# Patient Record
Sex: MALE | Race: WHITE | Employment: OTHER | URBAN - METROPOLITAN AREA
[De-identification: names, ages, dates, MRNs, and addresses within clinical notes are randomized per-mention and may not be internally consistent; named-entity substitution may affect disease eponyms.]

---

## 2017-11-22 ENCOUNTER — APPOINTMENT (OUTPATIENT)
Dept: GENERAL RADIOLOGY | Age: 42
End: 2017-11-22
Attending: PHYSICIAN ASSISTANT
Payer: COMMERCIAL

## 2017-11-22 ENCOUNTER — HOSPITAL ENCOUNTER (OUTPATIENT)
Age: 42
Discharge: HOME | End: 2017-11-22
Payer: COMMERCIAL

## 2017-11-22 VITALS
TEMPERATURE: 99 F | OXYGEN SATURATION: 98 % | RESPIRATION RATE: 18 BRPM | SYSTOLIC BLOOD PRESSURE: 111 MMHG | HEIGHT: 70 IN | DIASTOLIC BLOOD PRESSURE: 75 MMHG | BODY MASS INDEX: 23.62 KG/M2 | HEART RATE: 89 BPM | WEIGHT: 165 LBS

## 2017-11-22 DIAGNOSIS — J40 BRONCHITIS: Primary | ICD-10-CM

## 2017-11-22 PROCEDURE — 71020 XR CHEST PA + LAT CHEST (CPT=71020): CPT | Performed by: PHYSICIAN ASSISTANT

## 2017-11-22 PROCEDURE — 99213 OFFICE O/P EST LOW 20 MIN: CPT

## 2017-11-22 PROCEDURE — 99214 OFFICE O/P EST MOD 30 MIN: CPT

## 2017-11-22 RX ORDER — METHYLPREDNISOLONE 4 MG/1
TABLET ORAL
Qty: 1 PACKAGE | Refills: 0 | Status: SHIPPED | OUTPATIENT
Start: 2017-11-22 | End: 2017-11-27

## 2017-11-22 RX ORDER — AZITHROMYCIN 250 MG/1
TABLET, FILM COATED ORAL
Qty: 1 PACKAGE | Refills: 0 | Status: SHIPPED | OUTPATIENT
Start: 2017-11-22 | End: 2017-11-27

## 2017-11-22 RX ORDER — BENZONATATE 100 MG/1
100 CAPSULE ORAL 3 TIMES DAILY PRN
Qty: 30 CAPSULE | Refills: 0 | Status: SHIPPED | OUTPATIENT
Start: 2017-11-22 | End: 2017-12-22

## 2017-11-22 NOTE — ED PROVIDER NOTES
[facility]    History   Patient presents with:  Cough/URI    Stated Complaint: CONGESTION, cough    HPI    Patient is a 42-year-old otherwise healthy male who presents for evaluation of productive cough × normal. Pupils are equal, round, and reactive to light. Neck: Normal range of motion. Cardiovascular: Normal rate, regular rhythm, normal heart sounds and intact distal pulses. Pulmonary/Chest: Effort normal. He has no decreased breath sounds.  He ha

## 2017-11-22 NOTE — ED INITIAL ASSESSMENT (HPI)
C/o persistent cough for 3 weeks. Low grade temp started last night. Productive cough. Denies chest pain. Feels like he's wheezing at times. No SOB.

## 2019-10-12 ENCOUNTER — APPOINTMENT (OUTPATIENT)
Dept: GENERAL RADIOLOGY | Age: 44
End: 2019-10-12
Attending: FAMILY MEDICINE
Payer: COMMERCIAL

## 2019-10-12 ENCOUNTER — HOSPITAL ENCOUNTER (OUTPATIENT)
Age: 44
Discharge: HOME | End: 2019-10-12
Attending: FAMILY MEDICINE
Payer: COMMERCIAL

## 2019-10-12 VITALS
DIASTOLIC BLOOD PRESSURE: 79 MMHG | SYSTOLIC BLOOD PRESSURE: 123 MMHG | WEIGHT: 165 LBS | OXYGEN SATURATION: 99 % | TEMPERATURE: 98 F | HEART RATE: 66 BPM | BODY MASS INDEX: 24 KG/M2 | RESPIRATION RATE: 16 BRPM

## 2019-10-12 DIAGNOSIS — M79.672 LEFT FOOT PAIN: Primary | ICD-10-CM

## 2019-10-12 PROCEDURE — 99214 OFFICE O/P EST MOD 30 MIN: CPT

## 2019-10-12 PROCEDURE — 73630 X-RAY EXAM OF FOOT: CPT | Performed by: FAMILY MEDICINE

## 2019-10-12 PROCEDURE — 99213 OFFICE O/P EST LOW 20 MIN: CPT

## 2019-10-12 RX ORDER — PREDNISONE 20 MG/1
40 TABLET ORAL DAILY
Qty: 10 TABLET | Refills: 0 | Status: SHIPPED | OUTPATIENT
Start: 2019-10-12 | End: 2019-10-17

## 2019-10-12 NOTE — ED PROVIDER NOTES
Patient Seen in: [facility]      History   Patient presents with:   Foot Pain    Stated Complaint: foot pain    HPI    L foot pain   Base of 1st metatarsophalangeal joint  X 3-4 days  Stubbed toe 2 days ago and pain worsen Right foot 1st digit pain and swelling post injury x1 day ago. TECHNIQUE:    3 views were obtained. FINDINGS:          BONES:             There is joint space narrowing with marginal sclerosis at the 1st metatarsophalangeal joint.   Small osteophy

## 2019-10-12 NOTE — ED INITIAL ASSESSMENT (HPI)
PATIENT ARRIVED AMBULATORY TO ROOM C/O RIGHT FOOT PAIN THAT STARTED A FEW WEEKS AGO. PATIENT STATES HE HIT HIS FOOT A COUPLE OF DAYS AGO AND ITS BEEN HURTING SINCE.

## 2020-09-09 ENCOUNTER — HOSPITAL ENCOUNTER (OUTPATIENT)
Age: 45
Discharge: HOME | End: 2020-09-09
Attending: EMERGENCY MEDICINE
Payer: COMMERCIAL

## 2020-09-09 VITALS
SYSTOLIC BLOOD PRESSURE: 124 MMHG | HEART RATE: 62 BPM | OXYGEN SATURATION: 100 % | RESPIRATION RATE: 18 BRPM | TEMPERATURE: 99 F | DIASTOLIC BLOOD PRESSURE: 68 MMHG

## 2020-09-09 DIAGNOSIS — J02.0 STREPTOCOCCAL SORE THROAT: Primary | ICD-10-CM

## 2020-09-09 LAB — S PYO AG THROAT QL: POSITIVE

## 2020-09-09 PROCEDURE — 87430 STREP A AG IA: CPT

## 2020-09-09 PROCEDURE — 99213 OFFICE O/P EST LOW 20 MIN: CPT

## 2020-09-09 PROCEDURE — 99214 OFFICE O/P EST MOD 30 MIN: CPT

## 2020-09-09 RX ORDER — AMOXICILLIN 875 MG/1
875 TABLET, COATED ORAL 2 TIMES DAILY
Qty: 20 TABLET | Refills: 0 | Status: SHIPPED | OUTPATIENT
Start: 2020-09-09 | End: 2020-09-19

## 2020-09-10 NOTE — ED INITIAL ASSESSMENT (HPI)
PATIENT ARRIVED AMBULATORY TO ROOM C/O A SORE THROAT THAT STARTED THIS MORNING. PATIENT'S WIFE AND SON WERE IN THE IC TODAY AND POSITIVE FOR STREP. NO FEVERS. NO N/V/D.

## 2020-09-10 NOTE — ED PROVIDER NOTES
Patient Seen in: [facility]      History   Patient presents with:  Sore Throat    Stated Complaint: sore throat    HPI    The patient is a 39-year-old male with no significant past medical history presents now with sore t and lower extremities bilaterally  Extremities: No focal swelling or tenderness  Skin: No pallor, no redness or warmth to the touch      ED Course     Labs Reviewed   EMH POCT RAPID STREP - Abnormal; Notable for the following components:       Result Value

## 2021-06-17 ENCOUNTER — OFFICE VISIT (OUTPATIENT)
Dept: FAMILY MEDICINE CLINIC | Facility: CLINIC | Age: 46
End: 2021-06-17
Payer: COMMERCIAL

## 2021-06-17 VITALS
SYSTOLIC BLOOD PRESSURE: 131 MMHG | DIASTOLIC BLOOD PRESSURE: 85 MMHG | WEIGHT: 163 LBS | HEART RATE: 64 BPM | BODY MASS INDEX: 24.14 KG/M2 | HEIGHT: 69 IN

## 2021-06-17 DIAGNOSIS — H90.72 MIXED CONDUCTIVE AND SENSORINEURAL HEARING LOSS OF LEFT EAR WITH UNRESTRICTED HEARING OF RIGHT EAR: ICD-10-CM

## 2021-06-17 DIAGNOSIS — Z00.00 ROUTINE GENERAL MEDICAL EXAMINATION AT A HEALTH CARE FACILITY: Primary | ICD-10-CM

## 2021-06-17 PROCEDURE — 3075F SYST BP GE 130 - 139MM HG: CPT | Performed by: FAMILY MEDICINE

## 2021-06-17 PROCEDURE — 99386 PREV VISIT NEW AGE 40-64: CPT | Performed by: FAMILY MEDICINE

## 2021-06-17 PROCEDURE — 3008F BODY MASS INDEX DOCD: CPT | Performed by: FAMILY MEDICINE

## 2021-06-17 PROCEDURE — 3079F DIAST BP 80-89 MM HG: CPT | Performed by: FAMILY MEDICINE

## 2021-06-17 NOTE — PROGRESS NOTES
HPI/Subjective:   Patient ID: Reyna Buckley is a 48year old male.     HPI  Patient presents with:  Routine Physical: ANNUAL physical, new pt     Past Medical History:   Diagnosis Date   • Seasonal allergies      History/Other:   Review of Systems   Con with unrestricted hearing of right ear    Labs ordered. Follow up audiology evaluation. I reviewed past ENT notes and audiology exam and he would maybe benefit from correction.    Orders Placed This Encounter      CBC With Differential With Platelet      Co

## 2021-11-22 ENCOUNTER — LAB ENCOUNTER (OUTPATIENT)
Dept: LAB | Age: 46
End: 2021-11-22
Attending: FAMILY MEDICINE
Payer: COMMERCIAL

## 2021-11-22 DIAGNOSIS — Z00.00 ROUTINE GENERAL MEDICAL EXAMINATION AT A HEALTH CARE FACILITY: ICD-10-CM

## 2021-11-22 PROCEDURE — 80061 LIPID PANEL: CPT

## 2021-11-22 PROCEDURE — 80053 COMPREHEN METABOLIC PANEL: CPT

## 2021-11-22 PROCEDURE — 85025 COMPLETE CBC W/AUTO DIFF WBC: CPT

## 2021-11-22 PROCEDURE — 36415 COLL VENOUS BLD VENIPUNCTURE: CPT

## 2023-07-18 ENCOUNTER — HOSPITAL ENCOUNTER (OUTPATIENT)
Dept: MRI IMAGING | Age: 48
Discharge: HOME | End: 2023-07-18
Attending: Other
Payer: COMMERCIAL

## 2023-07-18 ENCOUNTER — HOSPITAL ENCOUNTER (OUTPATIENT)
Dept: MRI IMAGING | Age: 48
End: 2023-07-18
Attending: Other
Payer: COMMERCIAL

## 2023-07-18 DIAGNOSIS — H90.42 SENSORINEURAL HEARING LOSS (SNHL) OF LEFT EAR WITH UNRESTRICTED HEARING OF RIGHT EAR: ICD-10-CM

## 2023-07-18 PROCEDURE — 70553 MRI BRAIN STEM W/O & W/DYE: CPT | Performed by: OTHER

## 2023-07-18 PROCEDURE — A9575 INJ GADOTERATE MEGLUMI 0.1ML: HCPCS

## 2023-07-18 RX ORDER — GADOTERATE MEGLUMINE 376.9 MG/ML
15 INJECTION INTRAVENOUS
Status: COMPLETED | OUTPATIENT
Start: 2023-07-18 | End: 2023-07-18

## 2023-07-18 RX ADMIN — GADOTERATE MEGLUMINE 15 ML: 376.9 INJECTION INTRAVENOUS at 13:26:00

## 2024-02-21 ENCOUNTER — HOSPITAL ENCOUNTER (OUTPATIENT)
Age: 49
Discharge: HOME | End: 2024-02-21
Attending: EMERGENCY MEDICINE
Payer: COMMERCIAL

## 2024-02-21 VITALS
OXYGEN SATURATION: 96 % | SYSTOLIC BLOOD PRESSURE: 112 MMHG | HEART RATE: 77 BPM | DIASTOLIC BLOOD PRESSURE: 61 MMHG | TEMPERATURE: 99 F | RESPIRATION RATE: 19 BRPM

## 2024-02-21 DIAGNOSIS — J10.1 INFLUENZA A: Primary | ICD-10-CM

## 2024-02-21 LAB
POCT INFLUENZA A: POSITIVE
POCT INFLUENZA B: NEGATIVE
S PYO AG THROAT QL IA.RAPID: NEGATIVE
SARS-COV-2 RNA RESP QL NAA+PROBE: NOT DETECTED

## 2024-02-21 PROCEDURE — 99212 OFFICE O/P EST SF 10 MIN: CPT

## 2024-02-21 PROCEDURE — 87651 STREP A DNA AMP PROBE: CPT | Performed by: EMERGENCY MEDICINE

## 2024-02-21 PROCEDURE — 87502 INFLUENZA DNA AMP PROBE: CPT | Performed by: EMERGENCY MEDICINE

## 2024-02-21 PROCEDURE — 99203 OFFICE O/P NEW LOW 30 MIN: CPT

## 2024-02-22 NOTE — ED PROVIDER NOTES
Patient Seen in: [facility]      History     Chief Complaint   Patient presents with    Fever    Sore Throat     Stated Complaint: sore throat fever    Subjective:   HPI    Patient is a 49-year-old male, unvaccinated for COVID, no flu vaccine who presents now with sore throat, cough, congestion, fever.  The patient states the symptoms began 2 days ago.  Patient states temperatures as high as 102-103.  Patient states temperature responds somewhat to Motrin/Tylenol.  The patient recently traveled to [place] and was in a crowded plane on both flights.    Objective:   No pertinent past medical history.            No pertinent past surgical history.              No pertinent social history.            Review of Systems    Positive for stated complaint: sore throat fever  Other systems are as noted in HPI.  Constitutional and vital signs reviewed.      All other systems reviewed and negative except as noted above.    Physical Exam     ED Triage Vitals [02/21/24 1814]   /61   Pulse 77   Resp 19   Temp 99.1 °F (37.3 °C)   Temp src Temporal   SpO2 96 %   O2 Device None (Room air)       Current:/61   Pulse 77   Temp 99.1 °F (37.3 °C) (Temporal)   Resp 19   SpO2 96%         Physical Exam    Constitutional: Well-developed well-nourished in no acute distress  Head: Normocephalic, no swelling or tenderness  Eyes: Nonicteric sclera, no conjunctival injection  ENT: TMs are clear and flat bilaterally.  There is minimal posterior pharyngeal erythema  Chest: Clear to auscultation, no tenderness  Cardiovascular: Regular rate and rhythm without murmur  Abdomen: Soft, nontender and nondistended  Neurologic: Patient is awake, alert and oriented ×3.  The patient's motor strength is 5 out of 5 and symmetric in the upper and lower extremities bilaterally  Extremities: No focal swelling or tenderness  Skin: No pallor, no redness or warmth to the touch      ED Course     Labs Reviewed   POCT FLU TEST - Abnormal;  Notable for the following components:       Result Value    POCT INFLUENZA A Positive (*)     All other components within normal limits    Narrative:     This assay is a rapid molecular in vitro test utilizing nucleic acid amplification of influenza A and B viral RNA.   RAPID STREP A - Normal   RAPID SARS-COV-2 BY PCR - Normal             Pulse ox is 96% on room air, normal.  Vital signs are stable     Patient's negative COVID, negative strep, positive influenza A were discussed with the patient and his family.  The availability of Tamiflu was discussed as the patient is within the first 48 hours of symptoms.  Patient and his family declined.    MDM      Viral syndrome versus COVID versus strep throat                                   Medical Decision Making      Disposition and Plan     Clinical Impression:  1. Influenza A         Disposition:  Discharge  2/21/2024  6:48 pm    Follow-up:  Oh Bañuelos, DO  [address] [place] [id number]  [phone]      As needed          Medications Prescribed:  There are no discharge medications for this patient.

## 2024-03-04 ENCOUNTER — APPOINTMENT (OUTPATIENT)
Dept: GENERAL RADIOLOGY | Age: 49
End: 2024-03-04
Attending: NURSE PRACTITIONER
Payer: COMMERCIAL

## 2024-03-04 ENCOUNTER — HOSPITAL ENCOUNTER (OUTPATIENT)
Age: 49
Discharge: HOME | End: 2024-03-04
Payer: COMMERCIAL

## 2024-03-04 VITALS
SYSTOLIC BLOOD PRESSURE: 108 MMHG | HEART RATE: 80 BPM | RESPIRATION RATE: 18 BRPM | DIASTOLIC BLOOD PRESSURE: 66 MMHG | OXYGEN SATURATION: 98 % | TEMPERATURE: 100 F

## 2024-03-04 DIAGNOSIS — J06.9 VIRAL URI WITH COUGH: Primary | ICD-10-CM

## 2024-03-04 PROCEDURE — 71046 X-RAY EXAM CHEST 2 VIEWS: CPT | Performed by: NURSE PRACTITIONER

## 2024-03-04 PROCEDURE — 99214 OFFICE O/P EST MOD 30 MIN: CPT

## 2024-03-04 PROCEDURE — 99213 OFFICE O/P EST LOW 20 MIN: CPT

## 2024-03-04 NOTE — ED INITIAL ASSESSMENT (HPI)
Patient arrives ambulatory with c/o ongoing coughing Reports he had the flu 2 weeks ago. Reports he was feeling better, but then on Sunday, started to feel fatigues, increased coughing, coughing up green/yellow mucous.

## 2024-03-04 NOTE — ED PROVIDER NOTES
Patient Seen in: [facility]      History     Chief Complaint   Patient presents with    Cough/URI     Stated Complaint: follow up on flu    Subjective:   HPI    49-year-old male presents to immediate care with complaints of a cough.  Patient states he had influenza 2 weeks ago.  He states he improved but then 2 days ago he developed a reoccurring cough.  Patient denies chest pain.  Patient denies feeling short of breath.  Patient states he did go out and ran 3 miles and felt great.    Objective:   Past Medical History:   Diagnosis Date    Seasonal allergies               History reviewed. No pertinent surgical history.             Social History     Socioeconomic History    Marital status:    Tobacco Use    Smoking status: Never    Smokeless tobacco: Never   Vaping Use    Vaping Use: Never used   Substance and Sexual Activity    Alcohol use: No    Drug use: No              Review of Systems    Positive for stated complaint: follow up on flu  Other systems are as noted in HPI.  Constitutional and vital signs reviewed.      All other systems reviewed and negative except as noted above.    Physical Exam     ED Triage Vitals [03/04/24 1335]   /66   Pulse 80   Resp 18   Temp 100.4 °F (38 °C)   Temp src Temporal   SpO2 98 %   O2 Device None (Room air)       Current:/66   Pulse 80   Temp 100.4 °F (38 °C) (Temporal)   Resp 18   SpO2 98%         Physical Exam  Vitals reviewed.   Constitutional:       General: He is not in acute distress.  HENT:      Nose: Nose normal.      Mouth/Throat:      Mouth: Mucous membranes are moist.   Cardiovascular:      Rate and Rhythm: Normal rate and regular rhythm.   Pulmonary:      Effort: Pulmonary effort is normal. No respiratory distress.      Breath sounds: Normal breath sounds. No wheezing or rhonchi.   Musculoskeletal:         General: Normal range of motion.      Cervical back: Normal range of motion and neck supple.   Skin:     General: Skin is  warm and dry.   Neurological:      General: No focal deficit present.      Mental Status: He is alert and oriented to person, place, and time.   Psychiatric:         Mood and Affect: Mood normal.         Behavior: Behavior normal.               ED Course   Labs Reviewed - No data to display       XR CHEST PA + LAT CHEST (CPT=71046)          PROCEDURE: XR CHEST PA + LAT CHEST (CPT=71046)     COMPARISON: [facility], XR CHEST PA + LAT CHEST (CPT=71020), 11/22/2017, 5:14 PM.     INDICATIONS: Continued cough post Flu 2 weeks.  Cough productive 1 day.  Fever today.     TECHNIQUE:   Two views.       FINDINGS:   CARDIAC/VASC: No cardiac silhouette abnormality or cardiomegaly.  Unremarkable pulmonary vasculature.    MEDIAST/CARL: No visible mass or adenopathy.   LUNGS/PLEURA: No significant pulmonary parenchymal abnormalities.  No effusion or pleural thickening.    BONES: Scattered mild degenerative endplate changes in the visualized thoracolumbar spine.  OTHER: Negative.                =====  CONCLUSION: No acute cardiopulmonary abnormality.           Dictated by (CST): Jitendra Sethi MD on 3/04/2024 at 2:00 PM       Finalized by (CST): Jitendra Sethi MD on 3/04/2024 at 2:00 PM                               [facility] [place]                                         Medical Decision Making  49-year-old male presents with cough.  Differential diagnosis includes viral upper respiratory infection, pneumonia, reactive airway disease.  Patient is most concerned he may have pneumonia post influenza.  Chest x-ray was done and shows normal chest.  Results were discussed with patient.  He was offered prescriptions for cough suppressant, albuterol inhaler but declined both.  Patient was given printed information and instructions for help with symptom relief.  He was instructed to follow-up with his primary care doctor.    Amount and/or Complexity of Data Reviewed  Radiology: ordered.     Details: CXR reviewed by IC  provider.  Shows normal chest    Risk  OTC drugs.        Disposition and Plan     Clinical Impression:  1. Viral URI with cough         Disposition:  Discharge  3/4/2024  2:13 pm    Follow-up:  Oh Bañuelos  [facility] [id number] [phone]      If symptoms worsen          Medications Prescribed:  There are no discharge medications for this patient.

## 2024-03-08 ENCOUNTER — OFFICE VISIT (OUTPATIENT)
Dept: FAMILY MEDICINE CLINIC | Facility: CLINIC | Age: 49
End: 2024-03-08

## 2024-03-08 VITALS
WEIGHT: 158 LBS | HEIGHT: 69 IN | SYSTOLIC BLOOD PRESSURE: 113 MMHG | BODY MASS INDEX: 23.4 KG/M2 | HEART RATE: 82 BPM | TEMPERATURE: 98 F | DIASTOLIC BLOOD PRESSURE: 76 MMHG | OXYGEN SATURATION: 98 %

## 2024-03-08 DIAGNOSIS — R05.2 SUBACUTE COUGH: Primary | ICD-10-CM

## 2024-03-08 PROBLEM — H90.5 SENSORINEURAL HEARING LOSS (SNHL) OF LEFT EAR: Status: ACTIVE | Noted: 2023-01-03

## 2024-03-08 PROCEDURE — 3078F DIAST BP <80 MM HG: CPT | Performed by: FAMILY MEDICINE

## 2024-03-08 PROCEDURE — 3074F SYST BP LT 130 MM HG: CPT | Performed by: FAMILY MEDICINE

## 2024-03-08 PROCEDURE — 3008F BODY MASS INDEX DOCD: CPT | Performed by: FAMILY MEDICINE

## 2024-03-08 PROCEDURE — 99214 OFFICE O/P EST MOD 30 MIN: CPT | Performed by: FAMILY MEDICINE

## 2024-03-08 RX ORDER — ALBUTEROL SULFATE 90 UG/1
2 AEROSOL, METERED RESPIRATORY (INHALATION) EVERY 4 HOURS PRN
Qty: 1 EACH | Refills: 0 | Status: SHIPPED | OUTPATIENT
Start: 2024-03-08

## 2024-03-08 NOTE — PROGRESS NOTES
Subjective:   Marco Soto is a 49 year old male who presents for Follow - Up (Pt c/o flu. Pt stts he still has a cough)   Was diagnosed with influenza B.  Has had lingering cough for two weeks. Occasional fever.     History/Other:    Chief Complaint Reviewed and Verified  Nursing Notes Reviewed and   Verified  Tobacco Reviewed  Allergies Reviewed  Medications Reviewed    Problem List Reviewed         Tobacco:  He has never smoked tobacco.    No current outpatient medications on file.         Review of Systems:  Review of Systems   Constitutional: Negative.    HENT: Negative.     Respiratory:  Positive for cough.    Cardiovascular: Negative.          Objective:   /76 (BP Location: Left arm, Patient Position: Sitting, Cuff Size: adult)   Pulse 82   Temp 98 °F (36.7 °C) (Temporal)   Ht 5' 9\" (1.753 m)   Wt 158 lb (71.7 kg)   SpO2 98%   BMI 23.33 kg/m²  Estimated body mass index is 23.33 kg/m² as calculated from the following:    Height as of this encounter: 5' 9\" (1.753 m).    Weight as of this encounter: 158 lb (71.7 kg).  Physical Exam  Vitals reviewed.   HENT:      Right Ear: Tympanic membrane and ear canal normal.      Left Ear: Tympanic membrane and ear canal normal.      Nose: Nose normal.   Cardiovascular:      Rate and Rhythm: Normal rate and regular rhythm.      Heart sounds: Normal heart sounds.   Pulmonary:      Effort: Pulmonary effort is normal.      Breath sounds: Normal breath sounds.           Assessment & Plan:   1. Subacute cough (Primary)    Recovering from influenza A.  Has had persisent cough and intermittent fever.  Inhaler use.  If fever again then call and will get CBC. Had normal CXR this week.     No follow-ups on file.    Oh Bañuelos DO, 3/8/2024, 3:36 PM

## 2024-06-28 ENCOUNTER — ORDER TRANSCRIPTION (OUTPATIENT)
Dept: ADMINISTRATIVE | Facility: HOSPITAL | Age: 49
End: 2024-06-28

## 2024-06-28 DIAGNOSIS — Z13.9 ENCOUNTER FOR SCREENING: Primary | ICD-10-CM

## 2024-07-15 ENCOUNTER — OFFICE VISIT (OUTPATIENT)
Dept: FAMILY MEDICINE CLINIC | Facility: CLINIC | Age: 49
End: 2024-07-15

## 2024-07-15 VITALS
SYSTOLIC BLOOD PRESSURE: 108 MMHG | HEART RATE: 63 BPM | HEIGHT: 69 IN | OXYGEN SATURATION: 97 % | DIASTOLIC BLOOD PRESSURE: 64 MMHG | WEIGHT: 157 LBS | BODY MASS INDEX: 23.25 KG/M2

## 2024-07-15 DIAGNOSIS — Z00.00 ROUTINE GENERAL MEDICAL EXAMINATION AT A HEALTH CARE FACILITY: Primary | ICD-10-CM

## 2024-07-15 NOTE — PROGRESS NOTES
Subjective:   Marco Soto is a 49 year old male who presents for Routine Physical       History/Other:    Chief Complaint Reviewed and Verified  No Further Nursing Notes to   Review  Tobacco Reviewed  Allergies Reviewed  Medications Reviewed    Problem List Reviewed  Medical History Reviewed  Surgical History   Reviewed  Family History Reviewed  Social History Reviewed         Tobacco:  He has never smoked tobacco.    No current outpatient medications on file.         Review of Systems:  Review of Systems   Constitutional: Negative.    HENT: Negative.     Eyes: Negative.    Respiratory: Negative.     Cardiovascular: Negative.    Gastrointestinal: Negative.    Endocrine: Negative for polydipsia, polyphagia and polyuria.   Genitourinary: Negative.    Musculoskeletal: Negative.    Skin: Negative.         No mole changes   Allergic/Immunologic: Negative for environmental allergies.   Neurological:  Negative for dizziness, weakness, numbness and headaches.   Hematological: Negative.    Psychiatric/Behavioral:  Negative for sleep disturbance.         No anxiety or depressed feelings         Objective:   /64   Pulse 63   Ht 5' 9\" (1.753 m)   Wt 157 lb (71.2 kg)   SpO2 97%   BMI 23.18 kg/m²  Estimated body mass index is 23.18 kg/m² as calculated from the following:    Height as of this encounter: 5' 9\" (1.753 m).    Weight as of this encounter: 157 lb (71.2 kg).  Physical Exam  Vitals reviewed.   Constitutional:       Appearance: Normal appearance. He is well-developed.   HENT:      Head: Normocephalic.      Right Ear: Tympanic membrane, ear canal and external ear normal.      Left Ear: Tympanic membrane, ear canal and external ear normal.      Nose: Nose normal.   Eyes:      General: Lids are normal.      Conjunctiva/sclera: Conjunctivae normal.      Pupils: Pupils are equal, round, and reactive to light.      Funduscopic exam:     Right eye: No hemorrhage or papilledema.         Left eye: No  hemorrhage or papilledema.   Neck:      Vascular: Normal carotid pulses. No JVD.      Trachea: Trachea normal.   Cardiovascular:      Rate and Rhythm: Regular rhythm.      Pulses:           Carotid pulses are 2+ on the right side and 2+ on the left side.       Radial pulses are 2+ on the right side and 2+ on the left side.      Heart sounds: Normal heart sounds.   Pulmonary:      Breath sounds: Normal breath sounds.   Abdominal:      Tenderness: There is no abdominal tenderness.   Musculoskeletal:      Cervical back: Normal, normal range of motion and neck supple.      Thoracic back: Normal.      Lumbar back: Normal.   Lymphadenopathy:      Cervical: No cervical adenopathy.   Skin:     Comments: No suspicious lesions waist up exam   Neurological:      General: No focal deficit present.      Mental Status: He is alert and oriented to person, place, and time.      Sensory: No sensory deficit.      Deep Tendon Reflexes: Reflexes are normal and symmetric.   Psychiatric:         Mood and Affect: Mood normal. Mood is not anxious or depressed.           Assessment & Plan:   1. Routine general medical examination at a health care facility (Primary)  -     CBC With Differential With Platelet; Future; Expected date: 07/15/2024  -     Comp Metabolic Panel (14); Future; Expected date: 07/15/2024  -     Lipid Panel; Future; Expected date: 07/15/2024  -     PSA Total, Screen; Future; Expected date: 07/15/2024  -     Urinalysis, Routine; Future; Expected date: 07/15/2024    No new issues laboratory testing ordered.  Patient did do a screening visit with a cardiologist who had ordered a CT calcium score.  Will await results      No follow-ups on file.    Oh Bañuelos DO, 7/15/2024, 4:30 PM

## 2024-07-17 ENCOUNTER — LAB ENCOUNTER (OUTPATIENT)
Dept: LAB | Age: 49
End: 2024-07-17
Attending: FAMILY MEDICINE
Payer: COMMERCIAL

## 2024-07-17 DIAGNOSIS — Z00.00 ROUTINE GENERAL MEDICAL EXAMINATION AT A HEALTH CARE FACILITY: ICD-10-CM

## 2024-07-17 LAB
ALBUMIN SERPL-MCNC: 4.6 G/DL (ref 3.2–4.8)
ALBUMIN/GLOB SERPL: 1.7 {RATIO} (ref 1–2)
ALP LIVER SERPL-CCNC: 40 U/L
ALT SERPL-CCNC: 14 U/L
ANION GAP SERPL CALC-SCNC: 5 MMOL/L (ref 0–18)
AST SERPL-CCNC: 22 U/L (ref ?–34)
BASOPHILS # BLD AUTO: 0.03 X10(3) UL (ref 0–0.2)
BASOPHILS NFR BLD AUTO: 0.6 %
BILIRUB SERPL-MCNC: 0.8 MG/DL (ref 0.3–1.2)
BILIRUB UR QL: NEGATIVE
BUN BLD-MCNC: 14 MG/DL (ref 9–23)
BUN/CREAT SERPL: 13.1 (ref 10–20)
CALCIUM BLD-MCNC: 9.4 MG/DL (ref 8.7–10.4)
CHLORIDE SERPL-SCNC: 110 MMOL/L (ref 98–112)
CHOLEST SERPL-MCNC: 181 MG/DL (ref ?–200)
CLARITY UR: CLEAR
CO2 SERPL-SCNC: 28 MMOL/L (ref 21–32)
COMPLEXED PSA SERPL-MCNC: 0.93 NG/ML (ref ?–4)
CREAT BLD-MCNC: 1.07 MG/DL
DEPRECATED RDW RBC AUTO: 36 FL (ref 35.1–46.3)
EGFRCR SERPLBLD CKD-EPI 2021: 84 ML/MIN/1.73M2 (ref 60–?)
EOSINOPHIL # BLD AUTO: 0.07 X10(3) UL (ref 0–0.7)
EOSINOPHIL NFR BLD AUTO: 1.3 %
ERYTHROCYTE [DISTWIDTH] IN BLOOD BY AUTOMATED COUNT: 11.3 % (ref 11–15)
FASTING PATIENT LIPID ANSWER: YES
FASTING STATUS PATIENT QL REPORTED: YES
GLOBULIN PLAS-MCNC: 2.7 G/DL (ref 2–3.5)
GLUCOSE BLD-MCNC: 101 MG/DL (ref 70–99)
GLUCOSE UR-MCNC: NORMAL MG/DL
HCT VFR BLD AUTO: 43.7 %
HDLC SERPL-MCNC: 55 MG/DL (ref 40–59)
HGB BLD-MCNC: 15 G/DL
IMM GRANULOCYTES # BLD AUTO: 0.01 X10(3) UL (ref 0–1)
IMM GRANULOCYTES NFR BLD: 0.2 %
KETONES UR-MCNC: NEGATIVE MG/DL
LDLC SERPL CALC-MCNC: 115 MG/DL (ref ?–100)
LEUKOCYTE ESTERASE UR QL STRIP.AUTO: NEGATIVE
LYMPHOCYTES # BLD AUTO: 1.28 X10(3) UL (ref 1–4)
LYMPHOCYTES NFR BLD AUTO: 24.2 %
MCH RBC QN AUTO: 30.1 PG (ref 26–34)
MCHC RBC AUTO-ENTMCNC: 34.3 G/DL (ref 31–37)
MCV RBC AUTO: 87.8 FL
MONOCYTES # BLD AUTO: 0.57 X10(3) UL (ref 0.1–1)
MONOCYTES NFR BLD AUTO: 10.8 %
NEUTROPHILS # BLD AUTO: 3.33 X10 (3) UL (ref 1.5–7.7)
NEUTROPHILS # BLD AUTO: 3.33 X10(3) UL (ref 1.5–7.7)
NEUTROPHILS NFR BLD AUTO: 62.9 %
NITRITE UR QL STRIP.AUTO: NEGATIVE
NONHDLC SERPL-MCNC: 126 MG/DL (ref ?–130)
OSMOLALITY SERPL CALC.SUM OF ELEC: 297 MOSM/KG (ref 275–295)
PH UR: 5 [PH] (ref 5–8)
PLATELET # BLD AUTO: 193 10(3)UL (ref 150–450)
POTASSIUM SERPL-SCNC: 4.4 MMOL/L (ref 3.5–5.1)
PROT SERPL-MCNC: 7.3 G/DL (ref 5.7–8.2)
PROT UR-MCNC: NEGATIVE MG/DL
RBC # BLD AUTO: 4.98 X10(6)UL
SODIUM SERPL-SCNC: 143 MMOL/L (ref 136–145)
SP GR UR STRIP: 1.02 (ref 1–1.03)
TRIGL SERPL-MCNC: 57 MG/DL (ref 30–149)
UROBILINOGEN UR STRIP-ACNC: NORMAL
VLDLC SERPL CALC-MCNC: 10 MG/DL (ref 0–30)
WBC # BLD AUTO: 5.3 X10(3) UL (ref 4–11)

## 2024-07-17 PROCEDURE — 36415 COLL VENOUS BLD VENIPUNCTURE: CPT

## 2024-07-17 PROCEDURE — 85025 COMPLETE CBC W/AUTO DIFF WBC: CPT

## 2024-07-17 PROCEDURE — 80061 LIPID PANEL: CPT

## 2024-07-17 PROCEDURE — 80053 COMPREHEN METABOLIC PANEL: CPT

## 2024-07-17 PROCEDURE — 81001 URINALYSIS AUTO W/SCOPE: CPT

## 2024-07-30 ENCOUNTER — HOSPITAL ENCOUNTER (OUTPATIENT)
Dept: CT IMAGING | Age: 49
Discharge: HOME | End: 2024-07-30
Attending: INTERNAL MEDICINE

## 2024-07-30 DIAGNOSIS — Z13.9 ENCOUNTER FOR SCREENING: ICD-10-CM

## 2024-07-30 DIAGNOSIS — Z13.6 SCREENING FOR HEART DISEASE: ICD-10-CM

## 2025-01-07 ENCOUNTER — OFFICE VISIT (OUTPATIENT)
Dept: PHYSICAL MEDICINE AND REHAB | Facility: CLINIC | Age: 50
End: 2025-01-07
Payer: COMMERCIAL

## 2025-01-07 VITALS — HEIGHT: 69 IN | WEIGHT: 160 LBS | BODY MASS INDEX: 23.7 KG/M2

## 2025-01-07 DIAGNOSIS — S86.811A STRAIN OF CALF MUSCLE, RIGHT, INITIAL ENCOUNTER: ICD-10-CM

## 2025-01-07 DIAGNOSIS — M77.12 LATERAL EPICONDYLITIS OF LEFT ELBOW: Primary | ICD-10-CM

## 2025-01-07 PROCEDURE — 3008F BODY MASS INDEX DOCD: CPT | Performed by: PHYSICAL MEDICINE & REHABILITATION

## 2025-01-07 PROCEDURE — 99204 OFFICE O/P NEW MOD 45 MIN: CPT | Performed by: PHYSICAL MEDICINE & REHABILITATION

## 2025-01-07 NOTE — PATIENT INSTRUCTIONS
-Start OT and home exercises  -Ice and Voltaren gel 3-4 x daily   -Counterforce elbow strap during the day  -Follow-up in 4 weeks

## 2025-01-07 NOTE — PROGRESS NOTES
[facility]  NEW PATIENT EVALUATION      HISTORY OF PRESENT ILLNESS:     Chief Complaint   Patient presents with    New Patient     New patient is here with complaints of left outer elbow pain and right upper calf . Pain started initially 3 summers ago due to golfing and says it started again in October and hasn't gone away. Calf pain started December 26th due to pickle ball. No N/T. Not taking any pain meds or muscle relaxer's. No imaging has been completed . Pain depends on activity and position.        The patient is a 49 year old male with no significant past medical history who presents with left lateral elbow pain.  Patient states the symptoms started 4 summers ago while playing golf.  It was severe enough that he could not finish his round.  Since then pain has been intermittent in nature with occasional flareups and usually happens with more activity such as with golfing.  He will take a few weeks off and the pain sort of subsides however never quite is completely resolved.  In October his symptoms flared up and since then the pain has been constant.  There are better days and worse days but when the pain is severe it can be very debilitating.  He denies any swelling in the elbow no numbness tingling.  He does notice some weakness when the pain happens. He has tried some exercises at home that he found online.  He also has another issue in the right calf where he was playing pickle ball about 2 weeks ago when he felt a sharp pain.  He denied any snap or pop.  He felt a sudden positional change created the pain.  No swelling or bruising no radiating pain no numbness tingling.     PHYSICAL EXAM:   Ht 69\"   Wt 160 lb (72.6 kg)   BMI 23.63 kg/m²       WRIST/HAND:  Inspection: no erythema, swelling, or obvious deformity  Palpation: no ttp in carpal bones or areas of pain  ROM: intact to all planes of motion  Strength: 5/5 mild decreased  strength  Sensation:  Intact to light touch in all dermatomes of the lower extremities  Reflexes: 2/4 at C5, C6, C7  Phalens Test: negative for reproducible symptoms  Reverse Phalens: negative for reproducible symptoms  Tinel's test: negative for reproducible symptoms    IMAGING:     None    All imaging results were reviewed and discussed with patient.      ASSESSMENT/PLAN:     1. Lateral epicondylitis of left elbow    2. Strain of calf muscle, right, initial encounter        Marco Soto is a pleasant 49-year-old male presenting today for evaluation of left lateral elbow pain with tennis elbow and lateral epicondylitis as well as right calf strain injury more recently 2 weeks ago.  I recommended starting a PT program with home exercises topical treatment with ice and Voltaren gel as well as obtaining a counterforce elbow strap during the day.  Recommend he follow-up with me in 4 weeks.  If his pain is no better we will consider further treatment options.      The patient verbalized understanding with the plan and was in agreement. All questions/concerns were addressed and there were no barriers to learning.  Please note Dragon dictation software was used to dictate this note and may result in inadvertent typos.    Mandie Rondon DO, FAAPMR & CAQSM  Physical Medicine and Rehabilitation  Sports and Spine Medicine    PAST MEDICAL HISTORY:     Past Medical History:    Seasonal allergies         PAST SURGICAL HISTORY:   History reviewed. No pertinent surgical history.      CURRENT MEDICATIONS:     No current outpatient medications on file.         ALLERGIES:   Allergies[1]      FAMILY HISTORY:     Family History   Problem Relation Age of Onset    Cancer Brother     Heart Disease Brother     Depression Father     Breast Cancer Mother     Diabetes Mother           SOCIAL HISTORY:     Social History     Socioeconomic History    Marital status:    Tobacco Use    Smoking status: Never    Smokeless tobacco: Never   Vaping Use    Vaping  status: Never Used   Substance and Sexual Activity    Alcohol use: No    Drug use: No   Other Topics Concern    Caffeine Concern Yes    Exercise Yes     Social Drivers of Health     Housing Stability: Low Risk  (9/25/2023)    [facility] from [facility], [facility]    Housing Stability   Recent Concern: Housing Stability - At Risk (8/24/2023)    [facility]          REVIEW OF SYSTEMS:   Patient-reported ROS  Constitutional  Sleep Disturbance: denies  Chills: denies  Fever: denies  Weight Gain: denies  Weight Loss: denies   Cardiovascular  Chest Pain: denies  Irregular Heartbeat: denies   Respiratory  Painful Breathing: denies  Wheezing: denies   Gastrointestinal  Bowel Incontinence: denies  Heartburn: denies  Abdominal Pain: denies  Blood in Stool : denies  Rectal Pain: denies   Hematology  Easy Bruising: denies  Easy Bleeding: denies   Genitourinary  Difficulty Urinating: denies  Bladder Incontinence: denies  Pelvic Pain: denies   Musculoskeletal  Joint Stiffness: denies  Painful Joints: denies  Tailbone Pain: denies  Swollen Joints: denies   Peripheral Vascular  Swelling of Legs/Feet: denies  Cold Extremities: denies   Skin  Open Sores: denies  Nodules or Lumps: denies  Rash: denies   Neurological  Loss of Strength Since last Visit: denies  Tingling/Numbness: denies  Balance: denies   Psychiatric  Anxiety: denies  Depressed Mood: denies       PHYSICAL EXAM:   General: No immediate distress  Head: Normocephalic/ Atraumatic  Eyes: Extra-occular movements intact.   Ears: No auricular hematoma or deformities  Mouth: No lesions or ulcerations  Heart: peripheral pulses intact. Normal capillary refill.   Lungs: Non-labored respirations  Abdomen: No abdominal guarding  Extremities: No lower extremity edema bilaterally   Skin: No lesions noted   Cognition: alert & oriented x 3, attentive, able to follow 2 step commands, comprehention intact,  spontaneous speech intact  Psychiatric: Mood and affect appropriate      LABS:   No results found for: \"EAG\", \"A1C\"  Lab Results   Component Value Date    WBC 5.3 07/17/2024    RBC 4.98 07/17/2024    HGB 15.0 07/17/2024    HCT 43.7 07/17/2024    MCV 87.8 07/17/2024    MCH 30.1 07/17/2024    MCHC 34.3 07/17/2024    RDW 11.3 07/17/2024    .0 07/17/2024    MPV 7.3 (L) 10/08/2015     Lab Results   Component Value Date     (H) 07/17/2024    BUN 14 07/17/2024    BUNCREA 13.1 07/17/2024    CREATSERUM 1.07 07/17/2024    ANIONGAP 5 07/17/2024    GFRNAA 79 11/22/2021    GFRAA 91 11/22/2021    CA 9.4 07/17/2024    OSMOCALC 297 (H) 07/17/2024    ALKPHO 40 (L) 07/17/2024    AST 22 07/17/2024    ALT 14 07/17/2024    ALKPHOS 35 10/08/2015    BILT 0.8 07/17/2024    TP 7.3 07/17/2024    ALB 4.6 07/17/2024    GLOBULIN 2.7 07/17/2024    AGRATIO 1.6 10/08/2015     07/17/2024    K 4.4 07/17/2024     07/17/2024    CO2 28.0 07/17/2024     No results found for: \"PTP\", \"PT\", \"INR\"  No results found for: \"VITD\", \"QVITD\", \"EFTP76AP\"         [1] No Known Allergies

## 2025-01-15 ENCOUNTER — TELEPHONE (OUTPATIENT)
Dept: PHYSICAL THERAPY | Facility: HOSPITAL | Age: 50
End: 2025-01-15

## 2025-01-21 NOTE — PROGRESS NOTES
OCCUPATIONAL THERAPY UPPER EXTREMITY EVALUATION     Diagnosis:   Lateral epicondylitis of left elbow (M77.12)      Referring Provider:  ODETTE DALY Date of Evaluation:    1/22/2025    Precautions:  None Next MD visit:   none scheduled  Date of Surgery: n/a   Order Date:  1/7/2025  Authorizing Provider:   ODETTE DALY     Procedure:  OCCUPATIONAL THERAPY - INTERNAL [[id number]]         Order #:   [id number]  Qty:  1     Priority:  Routine                   Class:   EM - RFL     Standing Interval:          Standing Occurrences:          Expires on:            Expected by:    Associated DX:  Lateral epicondylitis of left elbow (M77.12)  Strain of calf muscle, right, initial encounter (S86.338F)     Order summary:  EM - RFL, Routine, 18 visits  Occupational  Therapy Area of Concentration: Orthopedic  Occupational Therapy Ortho: UE  If the patient can be seen sooner with a PT vs an OT, can we cancel this order and replace with a PT order? No         Comments:  Eval and Treat 2-3 x week for 4-6 weeks     Assess for bracing  Improve ROM of the elbow and wrist  Stretching and strengthening of the forearm flexors/extensors  Myofascial release and soft tissue work as indicated  Modalities as needed including ice/heat, grastin, ultrasound  Dry needling if indicated  Please provide a HEP           PATIENT SUMMARY   Pt is a 49 year old male who presents to therapy today with complaints of decreased function.  Patient states the symptoms started 4 summers ago while playing golf.  It was severe enough that he could not finish his round.  Since then pain has been intermittent in nature with occasional flareups and usually happens with more activity such as with golfing.  He will take a few weeks off and the pain sort of subsides however never quite is completely resolved.  In October his symptoms flared up and since then the pain has been constant.  There are better days and worse days but when the pain is severe it can be  very debilitating.  He denies any swelling in the elbow no numbness tingling.  He does notice some weakness when the pain happens. He has tried some exercises at home that he found online.   Pt describes pain level current 3/10, at best 2/10, at worst 5/10.   Current functional limitations include working out, playing golf, lifting/carrying, construction.  Pt describes prior level of function independent.   Employment: Working full duty, pt is in construction  Hand Dominance: right  Living Situation: Family  Imaging/Tests: none  Pt goals include return to golf.  Past medical history was reviewed with pt. Significant findings include none    ASSESSMENT  Pt presents to occupational therapy evaluation with primary c/o decreased function. The results of the objective tests and measures show decreased strength, increased edema and increased subjective c/o pain.  Functional deficits include but are not limited to working out, playing golf, lifting/carrying, construction.  Signs and symptoms are consistent with diagnosis of lateral epicondylitis of left elbow. Pt and OT discussed evaluation findings, pathology, POC and HEP.  Pt voiced understanding and performs HEP correctly without reported pain. Skilled Occupational Therapy is medically necessary to address the above impairments and reach functional goals.     OBJECTIVE    OBSERVATION: Unremarkable     ORTHOTICS: tennis elbow band      SENSORY: WNL    CIRCUMFERENTIAL EDEMA (cm):  Right Elbow crease: 25.5  Left Elbow crease: 26.6    ROM: (* denotes performed with pain) WNL        Strength (lbs) Right Average Left Average   : 95; with elbow extended: 97 50; with elbow extended: 35   2 pt Pinch: 16 10   3 pt Pinch: 20 18   Lateral Pinch: 23 21       Today’s Treatment and Response:   Treatment provided today in addition to the initial evaluation:   Date 1/22/25         Visit # 1/8         Evaluation Initial  X         Manual                                              Ther ex                                                                                                         HEP issued See table below         Therapeutic Activity                                                          Neuromuscular Re-education                           Modalities       MHP x 5\" x                    X= performed this date as previously outlined    Pt education was provided on exam findings, treatment diagnosis, treatment plan, expectations, and prognosis. Pt was also provided recommendations for splint, proper body mechanics and return to golf, tennis. Patient was instructed in and issued a HEP.    HEP Date issued  Date amended Date discharged  Comments (HEP2Go code if used)   [place] protocol Phase I and II  Active stretches 3-4, passive stretches 1-2 1/22/25      Guidelines to return to golf, return to tennis 1/22/25                                             Charges: OT Eval: Low Complexity, 1TE      Total Timed Treatment: 45 min     Total Treatment Time: 45 min     Based on clinical rationale and outcome measures, this evaluation involved Low Complexity decision making due to 1-2 personal factors/comorbidities, body structures involved/activity limitations, and unstable symptoms including changing pain levels.  PLAN OF CARE   Goals: (to be met in 8 visits)  Increase (L)  strength by 4-5 lbs to allow pt to return to playing golf.   Increase (L) 2 pt pinch by 2-3 lbs to allow pt to use tools.   Pt to verbalize 2/3 proper body mechanics with 100% accuracy.   Pt will be independent and compliant with comprehensive HEP to maintain progress achieved in OT    Frequency / Duration: Patient will be seen for 2 x/week or a total of 8 visits over a 90 day period.  Treatment will include: Manual Therapy, Neuromuscular Re-education, Self-Care Home Management, Therapeutic Activities, Therapeutic Exercise, Home Exercise Program instruction, and Modalities to include: Electrical stimulation  (unattended), Ultrasound, and Phonophoresis (w/10% HYDROCORTISONE)    Education or treatment limitation: None  Rehab Potential:good    Patient/Family/Caregiver was advised of these findings, precautions, and treatment options and has agreed to actively participate in planning and for this course of care.    Thank you for your referral. Please co-sign or sign and return this letter via fax as soon as possible to [phone]. If you have any questions, please contact me at Dept: [phone]    Sincerely,  Electronically signed by therapist: BALTAZAR Hoffmann/L  Physician's certification required: Yes  I certify the need for these services furnished under this plan of treatment and while under my care.    X___________________________________________________ Date____________________    Certification From: 1/22/2025  To:4/26/2025

## 2025-01-22 ENCOUNTER — OFFICE VISIT (OUTPATIENT)
Dept: PHYSICAL THERAPY | Age: 50
End: 2025-01-22
Attending: PHYSICAL MEDICINE & REHABILITATION
Payer: COMMERCIAL

## 2025-01-22 DIAGNOSIS — S86.811A STRAIN OF CALF MUSCLE, RIGHT, INITIAL ENCOUNTER: ICD-10-CM

## 2025-01-22 DIAGNOSIS — M77.12 LATERAL EPICONDYLITIS OF LEFT ELBOW: Primary | ICD-10-CM

## 2025-01-22 PROCEDURE — 97165 OT EVAL LOW COMPLEX 30 MIN: CPT

## 2025-01-22 PROCEDURE — 97110 THERAPEUTIC EXERCISES: CPT

## 2025-01-30 ENCOUNTER — OFFICE VISIT (OUTPATIENT)
Dept: PHYSICAL THERAPY | Age: 50
End: 2025-01-30
Attending: PHYSICAL MEDICINE & REHABILITATION
Payer: COMMERCIAL

## 2025-01-30 PROCEDURE — 97140 MANUAL THERAPY 1/> REGIONS: CPT

## 2025-01-30 PROCEDURE — 97035 APP MDLTY 1+ULTRASOUND EA 15: CPT

## 2025-01-30 NOTE — PROGRESS NOTES
Diagnosis:   Lateral epicondylitis of left elbow (M77.12)      Referring Provider:  ODETTE DALY Date of Evaluation:    1/22/2025    Precautions:  None Next MD visit:   none scheduled  Date of Surgery: n/a   Order Date:  1/7/2025  Authorizing Provider:   ODETTE DALY     Procedure:  OCCUPATIONAL THERAPY - INTERNAL [[id number]]         Order #:   [id number]  Qty:  1     Priority:  Routine                   Class:   EM - RFL     Standing Interval:          Standing Occurrences:          Expires on:            Expected by:    Associated DX:  Lateral epicondylitis of left elbow (M77.12)  Strain of calf muscle, right, initial encounter (S86.814R)     Order summary:  EM - RFL, Routine, 18 visits  Occupational  Therapy Area of Concentration: Orthopedic  Occupational Therapy Ortho: UE  If the patient can be seen sooner with a PT vs an OT, can we cancel this order and replace with a PT order? No         Comments:  Eval and Treat 2-3 x week for 4-6 weeks     Assess for bracing  Improve ROM of the elbow and wrist  Stretching and strengthening of the forearm flexors/extensors  Myofascial release and soft tissue work as indicated  Modalities as needed including ice/heat, grastin, ultrasound  Dry needling if indicated  Please provide a HEP     SUBJECTIVE: Pt states that he has been doing the exercises.      PAIN LEVELS: 2-3/10        OBJECTIVE: See tx log for details.       ORTHOTICS: tennis elbow band      SENSORY: WNL    CIRCUMFERENTIAL EDEMA (cm):  Right Elbow crease: 25.5  Left Elbow crease: 26.6    ROM: (* denotes performed with pain) WNL        Strength (lbs) Right Average Left Average   : 95; with elbow extended: 97 50; with elbow extended: 35   2 pt Pinch: 16 10   3 pt Pinch: 20 18   Lateral Pinch: 23 21       ASSESSMENT  Reviewed HEP with pt. Pt performing as instructed.  Tightness noted at ECRB with palpation.  Pt wearing wrist immobilization splint but not correct length. Reccommended pt to wear longer splint.   Pt agreed.  Weakness also noted in musculature which may also be contributing to inflammation in ECRB.  Pt  will have to perform strengthening once pain levels subside to minimize reoccurrence.  Pt verbalized good understanding.      Today’s Treatment and Response:   Treatment provided today in addition to the initial evaluation:   Date 1/22/25 1/30/25       Visit # 1/8  2/8       Evaluation Initial  X         Manual           STM   x        IASTM    x        cross friction massage    x       Ther ex                                                                                                         HEP issued See table below         Therapeutic Activity                                                          Neuromuscular Re-education                           Modalities       MHP x 5\" x x     U.S. 3.0 Mhz, 1.0 w/cm^2 x 8\" pulsed to ECRB  #1            X= performed this date as previously outlined    Pt education was provided on exam findings, treatment diagnosis, treatment plan, expectations, and prognosis. Pt was also provided recommendations for splint, proper body mechanics and return to golf, tennis. Patient was instructed in and issued a HEP.    HEP Date issued  Date amended Date discharged  Comments (HEP2Go code if used)   [place] protocol Phase I and II  Active stretches 3-4, passive stretches 1-2 1/22/25      Guidelines to return to golf, return to tennis 1/22/25                                               Goals: (to be met in 8 visits)  Increase (L)  strength by 4-5 lbs to allow pt to return to playing golf.   Increase (L) 2 pt pinch by 2-3 lbs to allow pt to use tools.   Pt to verbalize 2/3 proper body mechanics with 100% accuracy.   Pt will be independent and compliant with comprehensive HEP to maintain progress achieved in OT    PLAN: Patient will be seen for 2 x/week or a total of 8 visits over a 90 day period.  Treatment will include: Manual Therapy, Neuromuscular Re-education, Self-Care  Home Management, Therapeutic Activities, Therapeutic Exercise, Home Exercise Program instruction, and Modalities to include: Electrical stimulation (unattended), Ultrasound, and Phonophoresis (w/10% HYDROCORTISONE)      Charges: 1MT, 1US       Total Timed Treatment: 30 min     Total Treatment Time: 30 min       Rukhsana Garcia,OTR/L

## 2025-02-05 ENCOUNTER — OFFICE VISIT (OUTPATIENT)
Dept: PHYSICAL THERAPY | Age: 50
End: 2025-02-05
Attending: PHYSICAL MEDICINE & REHABILITATION
Payer: COMMERCIAL

## 2025-02-05 PROCEDURE — 97140 MANUAL THERAPY 1/> REGIONS: CPT

## 2025-02-05 PROCEDURE — 97035 APP MDLTY 1+ULTRASOUND EA 15: CPT

## 2025-02-05 NOTE — PROGRESS NOTES
Diagnosis:   Lateral epicondylitis of left elbow (M77.12)      Referring Provider:  ODETTE DALY Date of Evaluation:    1/22/2025    Precautions:  None Next MD visit:   none scheduled  Date of Surgery: n/a   Order Date:  1/7/2025  Authorizing Provider:   ODETTE DALY     Procedure:  OCCUPATIONAL THERAPY - INTERNAL [[id number]]         Order #:   [id number]  Qty:  1     Priority:  Routine                   Class:   EM - RFL     Standing Interval:          Standing Occurrences:          Expires on:            Expected by:    Associated DX:  Lateral epicondylitis of left elbow (M77.12)  Strain of calf muscle, right, initial encounter (S86.753C)     Order summary:  EM - RFL, Routine, 18 visits  Occupational  Therapy Area of Concentration: Orthopedic  Occupational Therapy Ortho: UE  If the patient can be seen sooner with a PT vs an OT, can we cancel this order and replace with a PT order? No         Comments:  Eval and Treat 2-3 x week for 4-6 weeks     Assess for bracing  Improve ROM of the elbow and wrist  Stretching and strengthening of the forearm flexors/extensors  Myofascial release and soft tissue work as indicated  Modalities as needed including ice/heat, grastin, ultrasound  Dry needling if indicated  Please provide a HEP     SUBJECTIVE: Pt states that he's wearing the splint and it's been helping.       PAIN LEVELS: 2-3/10        OBJECTIVE: See tx log for details.       ORTHOTICS: tennis elbow band      SENSORY: WNL    CIRCUMFERENTIAL EDEMA (cm):  Right Elbow crease: 25.5  Left Elbow crease: 26.6    ROM: (* denotes performed with pain) WNL        Strength (lbs) Right Average Left Average   : 95; with elbow extended: 97 50; with elbow extended: 35   2 pt Pinch: 16 10   3 pt Pinch: 20 18   Lateral Pinch: 23 21       ASSESSMENT  Decreased tightness noted at ECRB. Pt able to perform passive stretches without pain. Pt unable to work out at gym due to pain levels. Issued eccentric exercise.  Pt demonstrated  good understanding of HEP.  Pt continues to wear writ immobilization splint at all times. Will reassess status at next visit to determine if strengthening appropriate at that time. Pt in agreement with plan.      Today’s Treatment and Response:   Treatment provided today in addition to the initial evaluation:   Date 1/22/25 1/30/25 2/5/25     Visit # 1/8  2/8  3/8     Evaluation Initial  X         Manual           STM   x  x      IASTM    x  x      cross friction massage    x  x     Ther ex                                                                                                         HEP issued See table below    x     Therapeutic Activity                                                          Neuromuscular Re-education                           Modalities       MHP x 5\" x x x    U.S. 3.0 Mhz, 1.0 w/cm^2 x 8\" pulsed to ECRB  #1 #2           X= performed this date as previously outlined    Pt education was provided on exam findings, treatment diagnosis, treatment plan, expectations, and prognosis. Pt was also provided recommendations for splint, proper body mechanics and return to golf, tennis. Patient was instructed in and issued a HEP.    HEP Date issued  Date amended Date discharged  Comments (HEP2Go code if used)   [place] protocol Phase I and II  Active stretches 3-4, passive stretches 1-2 1/22/25      Guidelines to return to golf, return to tennis 1/22/25      Eccentric exercises for tennis elbow 2/5/25                                        Goals: (to be met in 8 visits)  Increase (L)  strength by 4-5 lbs to allow pt to return to playing golf.   Increase (L) 2 pt pinch by 2-3 lbs to allow pt to use tools.   Pt to verbalize 2/3 proper body mechanics with 100% accuracy.   Pt will be independent and compliant with comprehensive HEP to maintain progress achieved in OT    PLAN: Patient will be seen for 2 x/week or a total of 8 visits over a 90 day period.  Treatment will include: Manual Therapy,  Neuromuscular Re-education, Self-Care Home Management, Therapeutic Activities, Therapeutic Exercise, Home Exercise Program instruction, and Modalities to include: Electrical stimulation (unattended), Ultrasound, and Phonophoresis (w/10% HYDROCORTISONE)      Charges: 1MT, 1US       Total Timed Treatment: 30 min     Total Treatment Time: 30 min       Rukhsana Garcia,OTR/L

## 2025-02-12 ENCOUNTER — APPOINTMENT (OUTPATIENT)
Dept: PHYSICAL THERAPY | Age: 50
End: 2025-02-12
Attending: PHYSICAL MEDICINE & REHABILITATION
Payer: COMMERCIAL

## 2025-03-05 ENCOUNTER — APPOINTMENT (OUTPATIENT)
Dept: PHYSICAL THERAPY | Age: 50
End: 2025-03-05
Payer: COMMERCIAL

## 2025-03-19 ENCOUNTER — APPOINTMENT (OUTPATIENT)
Dept: PHYSICAL THERAPY | Age: 50
End: 2025-03-19
Payer: COMMERCIAL

## 2025-05-07 ENCOUNTER — LAB ENCOUNTER (OUTPATIENT)
Dept: LAB | Age: 50
End: 2025-05-07
Attending: INTERNAL MEDICINE
Payer: COMMERCIAL

## 2025-05-07 DIAGNOSIS — Z82.49 FAMILY HISTORY OF ISCHEMIC HEART DISEASE: Primary | ICD-10-CM

## 2025-05-07 LAB
ALBUMIN SERPL-MCNC: 4.7 G/DL (ref 3.2–4.8)
ALBUMIN/GLOB SERPL: 2 {RATIO} (ref 1–2)
ALP LIVER SERPL-CCNC: 41 U/L (ref 45–117)
ALT SERPL-CCNC: 16 U/L (ref 10–49)
ANION GAP SERPL CALC-SCNC: 10 MMOL/L (ref 0–18)
AST SERPL-CCNC: 26 U/L (ref ?–34)
BILIRUB SERPL-MCNC: 1 MG/DL (ref 0.3–1.2)
BUN BLD-MCNC: 15 MG/DL (ref 9–23)
BUN/CREAT SERPL: 12.9 (ref 10–20)
CALCIUM BLD-MCNC: 9.5 MG/DL (ref 8.7–10.4)
CHLORIDE SERPL-SCNC: 107 MMOL/L (ref 98–112)
CHOLEST SERPL-MCNC: 192 MG/DL (ref ?–200)
CO2 SERPL-SCNC: 26 MMOL/L (ref 21–32)
CREAT BLD-MCNC: 1.16 MG/DL (ref 0.7–1.3)
EGFRCR SERPLBLD CKD-EPI 2021: 76 ML/MIN/1.73M2 (ref 60–?)
FASTING PATIENT LIPID ANSWER: YES
FASTING STATUS PATIENT QL REPORTED: YES
GLOBULIN PLAS-MCNC: 2.4 G/DL (ref 2–3.5)
GLUCOSE BLD-MCNC: 87 MG/DL (ref 70–99)
HDLC SERPL-MCNC: 50 MG/DL (ref 40–59)
LDLC SERPL CALC-MCNC: 126 MG/DL (ref ?–100)
NONHDLC SERPL-MCNC: 142 MG/DL (ref ?–130)
OSMOLALITY SERPL CALC.SUM OF ELEC: 296 MOSM/KG (ref 275–295)
POTASSIUM SERPL-SCNC: 4.6 MMOL/L (ref 3.5–5.1)
PROT SERPL-MCNC: 7.1 G/DL (ref 5.7–8.2)
SODIUM SERPL-SCNC: 143 MMOL/L (ref 136–145)
TRIGL SERPL-MCNC: 85 MG/DL (ref 30–149)
VLDLC SERPL CALC-MCNC: 15 MG/DL (ref 0–30)

## 2025-05-07 PROCEDURE — 83695 ASSAY OF LIPOPROTEIN(A): CPT

## 2025-05-07 PROCEDURE — 36415 COLL VENOUS BLD VENIPUNCTURE: CPT

## 2025-05-07 PROCEDURE — 80053 COMPREHEN METABOLIC PANEL: CPT

## 2025-05-07 PROCEDURE — 80061 LIPID PANEL: CPT

## 2025-05-08 LAB — LIPOPROTEIN (A): <8.4 NMOL/L

## (undated) NOTE — LETTER
7/20/2021              Ashlyn Blanco        [address] [facility] [address]         Dear Luke Sparks,    Our records indicate that the tests ordered for you by Maddy Meredith, DO  have not been done.   If you have, in fact, already completed t